# Patient Record
Sex: MALE | Race: WHITE | Employment: FULL TIME | URBAN - METROPOLITAN AREA
[De-identification: names, ages, dates, MRNs, and addresses within clinical notes are randomized per-mention and may not be internally consistent; named-entity substitution may affect disease eponyms.]

---

## 2022-08-10 DIAGNOSIS — M79.672 LEFT FOOT PAIN: Primary | ICD-10-CM

## 2022-08-11 ENCOUNTER — OFFICE VISIT (OUTPATIENT)
Dept: ORTHOPEDIC SURGERY | Age: 42
End: 2022-08-11
Payer: COMMERCIAL

## 2022-08-11 VITALS — HEIGHT: 71 IN | BODY MASS INDEX: 24.78 KG/M2 | OXYGEN SATURATION: 100 % | WEIGHT: 177 LBS | RESPIRATION RATE: 16 BRPM

## 2022-08-11 DIAGNOSIS — M21.862 GASTROCNEMIUS EQUINUS OF LEFT LOWER EXTREMITY: ICD-10-CM

## 2022-08-11 DIAGNOSIS — M79.672 LEFT FOOT PAIN: Primary | ICD-10-CM

## 2022-08-11 DIAGNOSIS — M65.9 SYNOVITIS OF TOE: Primary | ICD-10-CM

## 2022-08-11 PROCEDURE — 1036F TOBACCO NON-USER: CPT | Performed by: ORTHOPAEDIC SURGERY

## 2022-08-11 PROCEDURE — G8427 DOCREV CUR MEDS BY ELIG CLIN: HCPCS | Performed by: ORTHOPAEDIC SURGERY

## 2022-08-11 PROCEDURE — G8420 CALC BMI NORM PARAMETERS: HCPCS | Performed by: ORTHOPAEDIC SURGERY

## 2022-08-11 PROCEDURE — 99204 OFFICE O/P NEW MOD 45 MIN: CPT | Performed by: ORTHOPAEDIC SURGERY

## 2022-08-11 NOTE — LETTER
[address] [place] [address] and Sports Medicine  [address] [address]  Phone: [phone]  Fax: [phone]    Verna Gibson MD    August 11, 2022     Raine Orta, [facility]    Patient: Richie Ricks   MR Number: [id number]   YOB: 1980   Date of Visit: 8/11/2022       Dear Raine Orta:    Thank you for referring Richie Ricks to me for evaluation/treatment. Below are the relevant portions of my assessment and plan of care. He has left foot pain, secondary to second MTP joint synovitis with metatarsalgia, as well as a significant underlying gastroc equinus contracture. The differential diagnosis I am considering includes the following: Neuroma, stress fracture. Notably, he has the past medical history as above. He has a history of GI/stomach ulcer, and hypothyroidism. We had a discussion today about the likely diagnosis and its natural history, physical exam and imaging findings, as well as various treatment options in detail. Surgically, we discussed that I do not recommend any surgical at this time, and did recommend conservative management. Depending on his clinical course, he may potentially benefit from a gastroc recession with possible second metatarsal shortening osteotomy. Orders/referrals were placed as below at today's visit. The patient was referred to physical therapy for calf stretching. The patient was referred to prosthetics and orthotics to obtain custom orthotics to offload his left second metatarsal head. I provided a prescription for Voltaren (4g TOPL q QID PRN pain). I recommend this over the use of oral NSAIDs because of the patient's history of GERD/GI ulcer. All questions were answered and the above plan was agreed upon. The patient will return to clinic PRN without x-rays.   In the future, we may consider a left second MTP joint injection with a possible course of immobilization in a cam boot. If you have questions, please do not hesitate to call me. I look forward to following Mary along with you.     Sincerely,      Bryanna Benavidez MD

## 2022-08-11 NOTE — PROGRESS NOTES
[address] [place] [address] AND SPORTS MEDICINE  [facility] Rachel Gonzalez  [address] [address]  Dept: [phone]    Ambulatory Orthopedic Consult      CHIEF COMPLAINT:    Chief Complaint   Patient presents with    Foot Pain     Left        HISTORY OF PRESENT ILLNESS:      The patient is a 38 y.o. male who is being seen for evaluation of the above, which began around 5/11/2022 atraumatically  . At today's visit, he is using no brace/assistive device. History is obtained today from:   [x]  the patient     [x]  [facility]     []  one family member/friend    []  multiple family members/friends    []  other:      He has previously seen a podiatrist for this (Quinn), and is here today seeking a second opinion. At today's visit, he localizes pain to the left second MTP joint plantarly. He denies any numbness/tingling. REVIEW OF SYSTEMS:  Musculoskeletal: See HPI for pertinent positives     Past Medical History:    He  has no past medical history on file. Past Surgical History:    He  has no past surgical history on file. Current Medications:   No current outpatient medications on file. Allergies:    Penicillin v    Family History:  family history is not on file. Social History:   Social History     Occupational History    Not on file   Tobacco Use    Smoking status: Never    Smokeless tobacco: Never   Substance and Sexual Activity    Alcohol use: Not on file    Drug use: Not on file    Sexual activity: Not on file     Works on his feet as a  full-time    OBJECTIVE:  Resp 16   Ht 5' 11\" (1.803 m)   Wt 177 lb (80.3 kg)   SpO2 100%   BMI 24.69 kg/m²    Psych: awake, alert  Cardio:  well perfused extremities, no cyanosis  Resp:  normal respiratory effort  Musculoskeletal:    RLE:  Vascular: Limb well perfused, compartments soft/compressible. Skin: No erythema/ulcers. Intact.    Neurovascular Status:  Grossly neurovascularly intact throughout   Tenderness to Palpation:        LLE:  Vascular: Limb well perfused, compartments soft/compressible. Skin: No erythema/ulcers. Intact. Neurovascular Status:  Grossly neurovascularly intact throughout   Tenderness to Palpation: Second MTP joint  -Pain with second MTP joint motion  - Gastroc equinus-significant  -No tenderness at the 1 -2 webspace or 2-3 webspace      RADIOLOGY:   8/11/2022 FINDINGS:  Three weightbearing views (AP, Mortise, and Lateral) of the left ankle and three weightbearing views (AP, Oblique, Lateral) of the left foot were obtained in the office today and reviewed, revealing no acute fracture, dislocation, or radiopaque foreign body/tumor. IMPRESSION:  No acute fracture/dislocation. Electronically signed by Aslhi Monique MD      ASSESSMENT AND PLAN:  Body mass index is 24.69 kg/m². He has left foot pain, secondary to second MTP joint synovitis with metatarsalgia, as well as a significant underlying gastroc equinus contracture. The differential diagnosis I am considering includes the following: Neuroma, stress fracture. Notably, he has the past medical history as above. He has a history of GI/stomach ulcer, and hypothyroidism. We had a discussion today about the likely diagnosis and its natural history, physical exam and imaging findings, as well as various treatment options in detail. Surgically, we discussed that I do not recommend any surgical at this time, and did recommend conservative management. Depending on his clinical course, he may potentially benefit from a gastroc recession with possible second metatarsal shortening osteotomy. Orders/referrals were placed as below at today's visit. The patient was referred to physical therapy for calf stretching. The patient was referred to prosthetics and orthotics to obtain custom orthotics to offload his left second metatarsal head. I provided a prescription for Voltaren (4g TOPL q QID PRN pain). I recommend this over the use of oral NSAIDs because of the patient's history of GERD/GI ulcer. All questions were answered and the above plan was agreed upon. The patient will return to clinic PRN without x-rays. In the future, we may consider a left second MTP joint injection with a possible course of immobilization in a cam boot. At the patient's next visit, depending on how the patient is doing and/or new imaging/labs results, we may consider the following options:    []  Lace up ankle     []  CAM boot         []  removable wrist brace     []  PT:        []  Wean out immobilization         []  Adv activity      []  Footmind        []  Spenco       []  Custom Orthotic:               []  AZ brace                    []  Rocker Bottom      []  Night splint    []  Heel cups        []  Strap        []  Toe gizmos    []  Topl        []  NSAIDs         []  Renetta        []  Ref:         []  Stress Xray    []  CT        []  MRI  []  Inj:          []  Consider OR      []  Pick OR date    No follow-ups on file. No orders of the defined types were placed in this encounter. No orders of the defined types were placed in this encounter. Beni Mendoza MD  Orthopedic Surgery        Please excuse any typos/errors, as this note was created with the assistance of voice recognition software. While intending to generate a document that actually reflects the content of the visit, the document can still have some errors including those of syntax and sound-a-like substitutions which may escape proof reading. In such instances, actual meaning can be extrapolated by context.